# Patient Record
(demographics unavailable — no encounter records)

---

## 2024-11-12 NOTE — DISCUSSION/SUMMARY
[EKG obtained to assist in diagnosis and management of assessed problem(s)] : EKG obtained to assist in diagnosis and management of assessed problem(s) [FreeTextEntry1] : Pt is a 51 y/o M who presents today for initial evaluation.  Pt has PMH HLD  HLD: repeat labs  if still high - consider starting antiHTN Advised lifestyle modifications  Will check transthoracic echocardiogram to evaluate left ventricular function and assess for any structural abnormalities  check CCTA  The described plan was discussed with the pt.  All questions and concerns were addressed to the best of my knowledge.

## 2024-11-12 NOTE — HISTORY OF PRESENT ILLNESS
[FreeTextEntry1] : Pt is a 51 y/o M who presents today for initial evaluation.  Pt has PMH HLD He is feeling well overall - denies CP, SOB, diaphoresis, palpitations, dizziness, syncope, LE edema, PND, orthopnea.    additional PMH: asthma Previous surgeries: hernia repair - no problems with anesthesia Family hx: no SCD, mother HLD Smoking status: never social ETOH no drug use Current exercise: none Daily water intake: "not alot" Daily caffeine intake: 2-3 cups tea OTC medications: tylenol/advil PRN Previous cardiac testing: none

## 2025-05-02 NOTE — ASSESSMENT
[FreeTextEntry1] : trial of Breo inhaler, use as directed c/w Albuterol inhaler as needed c/w over the counter antihistamine  if symptoms worsen, can take Prednisone as directed call back or f/u in office if symptoms worsen and/or don't improve

## 2025-05-02 NOTE — REVIEW OF SYSTEMS
[Fever] : no fever [Chills] : no chills [Chest Pain] : no chest pain [FreeTextEntry4] : sinus congestion  [FreeTextEntry6] : as per HPI

## 2025-05-02 NOTE — PHYSICAL EXAM
[No Acute Distress] : no acute distress [Well Nourished] : well nourished [Well Developed] : well developed [Normal Oropharynx] : the oropharynx was normal [Normal TMs] : both tympanic membranes were normal [Normal Appearance] : was normal in appearance [Neck Supple] : was supple [No Respiratory Distress] : no respiratory distress  [Clear to Auscultation] : lungs were clear to auscultation bilaterally [Normal Rate] : normal rate  [Regular Rhythm] : with a regular rhythm [Normal S1, S2] : normal S1 and S2 [No Murmur] : no murmur heard [Normal Anterior Cervical Nodes] : no anterior cervical lymphadenopathy [Alert and Oriented x3] : oriented to person, place, and time [Normal Insight/Judgement] : insight and judgment were intact

## 2025-05-02 NOTE — HISTORY OF PRESENT ILLNESS
[FreeTextEntry8] : 50 year old male with h/o asthma and allergies presents c/o chest congestion and some difficulty breathing for the past 3-4 days. His home is under construction- has had exposure to dust. He also feels his allergies have been worsened at this time. He has been using an old Albuterol inhaler multiple times a day. No fever or chills. He has started to take Zyrtec for the past 2 days and Benadryl at night which has helped.

## 2025-06-11 NOTE — HISTORY OF PRESENT ILLNESS
[de-identified] : 50 year old male presents for annual physical. He currently feels well today.   Has asthma, allergies feeling well- improved since his last visit has not needed to use his inhalers recently   h/o hemorrhoids- seen by colorectal surgery in 2023  had colonoscopy   has h/o hyperlipidemia  had recent cardiology evaluation  exercise stress testing done in Feb 2025- no ischemic changes  [FreeTextEntry1] : Annual physical

## 2025-06-11 NOTE — PHYSICAL EXAM
[No Acute Distress] : no acute distress [Well Nourished] : well nourished [Well Developed] : well developed [Well-Appearing] : well-appearing [Normal Sclera/Conjunctiva] : normal sclera/conjunctiva [PERRL] : pupils equal round and reactive to light [Normal TMs] : both tympanic membranes were normal [Normal Oropharynx] : the oropharynx was normal [Normal Appearance] : was normal in appearance [Neck Supple] : was supple [Normal] : the thyroid was normal [No Respiratory Distress] : no respiratory distress  [Clear to Auscultation] : lungs were clear to auscultation bilaterally [Normal Rate] : normal rate  [Regular Rhythm] : with a regular rhythm [Normal S1, S2] : normal S1 and S2 [No Murmur] : no murmur heard [No Carotid Bruits] : no carotid bruits [No Edema] : there was no peripheral edema [Soft] : abdomen soft [Non Tender] : non-tender [Normal Bowel Sounds] : normal bowel sounds [Normal Posterior Cervical Nodes] : no posterior cervical lymphadenopathy [Normal Anterior Cervical Nodes] : no anterior cervical lymphadenopathy [Grossly Normal Strength/Tone] : grossly normal strength/tone [No Rash] : no rash [No Focal Deficits] : no focal deficits [Alert and Oriented x3] : oriented to person, place, and time [Normal Mood] : the mood was normal [Normal Insight/Judgement] : insight and judgment were intact

## 2025-06-11 NOTE — ASSESSMENT
[Vaccines Reviewed] : Immunizations reviewed today. Please see immunization details in the vaccine log within the immunization flowsheet.  [FreeTextEntry1] : Health care maintenance: check blood work, blood drawn in office follow up with optometry/ophthalmology and dentist for routine exams when due up to date with colonoscopy  recommend Shingles vaccination series  Hyperlipidemia: s/p recent cardiology evaluation c/w diet and exercise as tolerated  check blood work  Asthma: stable c/w Albuterol PRN c/w Breo inhaler when needed